# Patient Record
Sex: MALE | Race: BLACK OR AFRICAN AMERICAN | NOT HISPANIC OR LATINO | URBAN - METROPOLITAN AREA
[De-identification: names, ages, dates, MRNs, and addresses within clinical notes are randomized per-mention and may not be internally consistent; named-entity substitution may affect disease eponyms.]

---

## 2023-06-27 ENCOUNTER — OFFICE VISIT (OUTPATIENT)
Dept: URGENT CARE | Facility: URGENT CARE | Age: 43
End: 2023-06-27
Payer: COMMERCIAL

## 2023-06-27 VITALS
SYSTOLIC BLOOD PRESSURE: 160 MMHG | DIASTOLIC BLOOD PRESSURE: 90 MMHG | TEMPERATURE: 97.6 F | OXYGEN SATURATION: 99 % | WEIGHT: 220 LBS | RESPIRATION RATE: 16 BRPM | HEART RATE: 69 BPM

## 2023-06-27 DIAGNOSIS — S02.5XXA CLOSED FRACTURE OF TOOTH, INITIAL ENCOUNTER: Primary | ICD-10-CM

## 2023-06-27 DIAGNOSIS — I10 ELEVATED BLOOD PRESSURE READING WITH DIAGNOSIS OF HYPERTENSION: ICD-10-CM

## 2023-06-27 DIAGNOSIS — K04.7 TOOTH INFECTION: ICD-10-CM

## 2023-06-27 PROCEDURE — 99203 OFFICE O/P NEW LOW 30 MIN: CPT | Performed by: PHYSICIAN ASSISTANT

## 2023-06-27 RX ORDER — NAPROXEN 500 MG/1
500 TABLET ORAL 2 TIMES DAILY WITH MEALS
Qty: 20 TABLET | Refills: 0 | Status: SHIPPED | OUTPATIENT
Start: 2023-06-27

## 2023-06-27 RX ORDER — CLINDAMYCIN HCL 300 MG
300 CAPSULE ORAL 4 TIMES DAILY
Qty: 40 CAPSULE | Refills: 0 | Status: SHIPPED | OUTPATIENT
Start: 2023-06-27 | End: 2023-07-07

## 2023-06-27 NOTE — PROGRESS NOTES
Chief Complaint   Patient presents with     Dental Pain               ASSESSMENT:    ICD-10-CM    1. Closed fracture of tooth, initial encounter  S02.5XXA clindamycin (CLEOCIN) 300 MG capsule     naproxen (NAPROSYN) 500 MG tablet      2. Tooth infection  K04.7 clindamycin (CLEOCIN) 300 MG capsule     naproxen (NAPROSYN) 500 MG tablet      3. Elevated blood pressure reading with diagnosis of hypertension  I10               PLAN: Tooth infection/cracked.  Penicillin allergy.  Clindamycin.  Eat Greek yogurt or take probiotics with it.  Seek another dentist to get in within the next 2 weeks.     Elevated blood pressure.  Has not taken blood pressure medication today and just started his new 1 week ago.  Recheck outside of clinic and follow-up with primary if any concerns.  Asha Sandhu PA-C        SUBJECTIVE:  Bola Brown is an 42 year old male whom presents with right bottom cracked tooth while eating chicken a few days ago.  Significant tooth pain.  States he cannot get into a dentist until September.  Dentist in the past that he needs to have many teeth pulled and get complete dentures.  Elevated blood pressure here today, denies headache or dizziness.  His blood pressure medication was just switched 1 week ago and he has not taken it yet today.      ROS:  Constitutional: No fevers  ENT: as above    OBJECTIVE:   Blood pressure (!) 178/105, pulse 69, temperature 97.6  F (36.4  C), temperature source Tympanic, resp. rate 16, weight 99.8 kg (220 lb), SpO2 99 %.   Recheck manual 160/90  Eye exam : conjunctiva clear.  Mouth:  Airway intact.  Poor dentition.  Right bottom second tooth from the back has half of it cracked off.  Gum looks erythematous and inflamed.  Tender to tapping.  NECK:  The neck is supple and free of adenopathy or masses,          Asha Sandhu PA-C